# Patient Record
Sex: MALE | Race: ASIAN | Employment: FULL TIME | URBAN - METROPOLITAN AREA
[De-identification: names, ages, dates, MRNs, and addresses within clinical notes are randomized per-mention and may not be internally consistent; named-entity substitution may affect disease eponyms.]

---

## 2018-10-15 ENCOUNTER — OFFICE VISIT (OUTPATIENT)
Dept: URGENT CARE | Facility: URGENT CARE | Age: 33
End: 2018-10-15
Payer: COMMERCIAL

## 2018-10-15 VITALS
SYSTOLIC BLOOD PRESSURE: 110 MMHG | HEART RATE: 82 BPM | OXYGEN SATURATION: 98 % | TEMPERATURE: 97.6 F | DIASTOLIC BLOOD PRESSURE: 72 MMHG
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
## 2018-10-15 DIAGNOSIS — S00.81XA FOREHEAD ABRASION, INITIAL ENCOUNTER: ICD-10-CM

## 2018-10-15 PROCEDURE — 90471 IMMUNIZATION ADMIN: CPT | Performed by: FAMILY MEDICINE

## 2018-10-15 PROCEDURE — 90715 TDAP VACCINE 7 YRS/> IM: CPT | Performed by: FAMILY MEDICINE

## 2018-10-15 PROCEDURE — 99203 OFFICE O/P NEW LOW 30 MIN: CPT | Mod: 25 | Performed by: FAMILY MEDICINE
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
## 2018-10-16 NOTE — PATIENT INSTRUCTIONS
Human Bite  The mouth has bacteria (germs) that can cause a very severe infection. If the tooth of another person has cut your skin, there is a chance of a serious infection developing within the first few days. Bites to the hand are especially prone to infection of the skin (such as cellulitis). Diseases (such as hepatitis B or C, or herpes simplex virus) may also be transmitted through human bites.  Human bite wounds may be either sutured closed or left open to heal depending on location, length of time since the bite, severity, signs of infection, and other concerns. Your doctor may want to do blood tests, a wound culture, X-ray, ultrasound, or others. Your doctor will explain if you need any of these and discuss your results.  Home care  The following will help you care for your wound at home:  1. Most skin wounds heal within 10 days. However, a human bite wound has a higher risk of getting infected. Look at the bite area each day for the next 4 days for signs of infection (listed below).  2. For certain types of wounds, an antibiotic will be prescribed. This will depend on several factors such as severity, surrounding structure injury, depth, location, and others. Take all antibiotics and medicines as directed until they are all gone.  3. If the bite is on the hand, arm, foot, or leg, limit the use of that extremity and keep it elevated for the first 24 hours.  4. You may be given a tetanus shot if needed.  5. Don't suck on the wound. This may introduce more bacteria.  Follow-up care  Follow up with your healthcare provider, or this facility as directed.  When to seek medical advice  Call your healthcare provider right away if you have any of these:    Spreading redness    Increased pain or swelling    Fever of 100.4  F (38  C) or higher, or as directed by your healthcare provider    Colored fluid or pus draining from the wound    Any signs of nerve or tendon damage, such as inability to bend a joint or feel  an area of skin  Date Last Reviewed: 6/1/2016 2000-2017 [employer] [place]. [address] [place] [address], [place] [address]. All rights reserved. This information is not intended as a substitute for professional medical care. Always follow your healthcare professional's instructions.

## 2020-10-28 ENCOUNTER — OFFICE VISIT (OUTPATIENT)
Dept: FAMILY MEDICINE | Facility: CLINIC | Age: 35
End: 2020-10-28

## 2020-10-28 VITALS
WEIGHT: 165.8 LBS | HEIGHT: 68 IN | HEART RATE: 86 BPM | TEMPERATURE: 99.2 F | DIASTOLIC BLOOD PRESSURE: 86 MMHG | OXYGEN SATURATION: 97 % | BODY MASS INDEX: 25.13 KG/M2 | SYSTOLIC BLOOD PRESSURE: 130 MMHG

## 2020-10-28 DIAGNOSIS — G43.809 HEADACHE, VARIANT MIGRAINE: Primary | ICD-10-CM

## 2020-10-28 DIAGNOSIS — Z71.89 ACP (ADVANCE CARE PLANNING): ICD-10-CM

## 2020-10-28 DIAGNOSIS — Z76.89 HEALTH CARE HOME: ICD-10-CM

## 2020-10-28 PROCEDURE — 99203 OFFICE O/P NEW LOW 30 MIN: CPT | Performed by: FAMILY MEDICINE

## 2020-10-28 RX ORDER — PROPRANOLOL HCL 60 MG
60 CAPSULE, EXTENDED RELEASE 24HR ORAL DAILY
Qty: 90 CAPSULE | Refills: 3 | Status: SHIPPED | OUTPATIENT
Start: 2020-10-28

## 2020-10-28 ASSESSMENT — MIFFLIN-ST. JEOR: SCORE: 1673.31

## 2020-10-28 NOTE — PROGRESS NOTES
S:    Avel Schwartz is a 35 year old year old male who presents with a 20 year history of headaches occuring in the behind the eyes area. The headaches are described as throbbing and pressure. They are associated with photophobia but no nausea or vomiting  Pt headache usually improves with sitting in a dark room and taking tylenol.    1-2 headaches /week  Will last 2-4 hours at a time but again responds to tylenol    Mom has a hx of headaches type unknown           ROS:       CONSTITUTIONAL: no constitutional symptoms.       EYES:  no eye complaints except for dry eyes at times     .       ENT: no ENT symptoms  and sneezing or nasal congestion.       NEUROLOGICAL:  no neuro symptoms  .       PSYCHIATRIC: no psychiatric symptoms .       ENDOCRINE: no endocrine symptoms        .       ALLERGY/IMMUNOLOGIC- COMPLAINS of no allergy/immunologic symptoms.              O:       GENERAL:  Well developed, well nourished male in NAD.       HEENT: Normocephalic, atraumatic; Ears-TMs and external auditory        canals normal; Eyes- PERRL, EOMI,  fundi benign; Nose- clear;        Mouth- normal; Pharynx- clear       NECK: Supple without adenopathy or thyromegaly       CHEST: Normal       LUNGS: CTA       HEART:  RRR, S1S2 without murmur.       ABDOMEN: Soft, nontender, normal bowel sounds, no masses, no        hepatosplenomegaly.       NEUROLOGICAL: Cranial nerves II-XII intact. Reflexes symmetrical.        No focal motor or sensory deficits. Cerebellar normal.       LYMPH: No lymphadenopathy.       SKIN:  No rashes.             (G43.809) Headache, variant migraine  (primary encounter diagnosis)  Comment: the fact the headaches have been present 20 years without progression in severity or frequency makes neuro pathology very unlikely  Plan: propranolol ER (INDERAL LA) 60 MG 24 hr capsule        As preventive  Take tylenol 1000mg at a time at onset of headache    (Z71.89) ACP (advance care planning)  Comment:   Plan:      (Z76.89) Health Care Home  Comment:   Plan:

## 2020-10-28 NOTE — NURSING NOTE
Avel is here to establish care and ongoing headaches.    Pre-Visit Screening:  Immunizations:UTD  Colonoscopy:NA  Mammogram:NA  Asthma Action Test/Plan:NA  PHQ9:NA  GAD7:NA  Questioned patient about current smoking habits Pt.never smoked  OK to leave a detailed message on voice mail for today's visit yes, phone # [phone]

## 2020-10-28 NOTE — LETTER
[facility]  [address]  [address]  [facility] [address]  [phone]      October 28, 2020      Avel Schwartz  [address] [facility] [address]      EMERGENCY CARE PLAN  Presenting Problem Treatment Plan   Questions or concerns during clinic hours I will call the clinic directly:    [facility]  [address] , [address]  [place], [place] [id number] [phone]   Questions or concerns outside clinic hours  I will call the 24 hour line at [phone]   Patient needs to schedule an appointment  I will call the  scheduling line at [phone]   Same day treatment   I will call the clinic first, then  urgent care and/or  express care if needed   Clinic Care Coordinators Karen Morelos RN:  [phone]  [facility] Clinical Support Staff: [phone]    Crisis Services:  Behavioral or Mental Health P (Behavioral Health Providers)   [phone]   Emergency treatment--Immediately CALL 101

## 2021-01-03 ENCOUNTER — HEALTH MAINTENANCE LETTER (OUTPATIENT)
Age: 36
End: 2021-01-03

## 2021-10-10 ENCOUNTER — HEALTH MAINTENANCE LETTER (OUTPATIENT)
Age: 36
End: 2021-10-10

## 2021-12-08 ENCOUNTER — NURSE TRIAGE (OUTPATIENT)
Dept: FAMILY MEDICINE | Facility: CLINIC | Age: 36
End: 2021-12-08

## 2021-12-08 ENCOUNTER — OFFICE VISIT (OUTPATIENT)
Dept: URGENT CARE | Facility: URGENT CARE | Age: 36
End: 2021-12-08
Payer: COMMERCIAL

## 2021-12-08 VITALS
TEMPERATURE: 98.8 F | BODY MASS INDEX: 25.89 KG/M2 | SYSTOLIC BLOOD PRESSURE: 138 MMHG | HEART RATE: 110 BPM | WEIGHT: 170.8 LBS | RESPIRATION RATE: 16 BRPM | DIASTOLIC BLOOD PRESSURE: 80 MMHG | OXYGEN SATURATION: 98 %

## 2021-12-08 DIAGNOSIS — L08.9 INFECTED CYST OF SKIN: Primary | ICD-10-CM

## 2021-12-08 DIAGNOSIS — L72.9 INFECTED CYST OF SKIN: Primary | ICD-10-CM

## 2021-12-08 PROCEDURE — 99213 OFFICE O/P EST LOW 20 MIN: CPT | Performed by: PHYSICIAN ASSISTANT

## 2021-12-08 RX ORDER — MUPIROCIN 20 MG/G
OINTMENT TOPICAL 3 TIMES DAILY
Qty: 30 G | Refills: 0 | Status: SHIPPED | OUTPATIENT
Start: 2021-12-08 | End: 2021-12-18

## 2021-12-08 RX ORDER — CEPHALEXIN 500 MG/1
500 CAPSULE ORAL 4 TIMES DAILY
Qty: 28 CAPSULE | Refills: 0 | Status: SHIPPED | OUTPATIENT
Start: 2021-12-08 | End: 2021-12-15

## 2021-12-08 NOTE — PROGRESS NOTES
Assessment & Plan     Infected cyst of skin  Symptoms and exam suggest.  Most likely from an ingrown hair.  Affected area is indurated today.  I&D is deferred.  Keflex is prescribed today.  Bactroban ointment also prescribed.  Recommended heat compresses over the affected area.  Tylenol Motrin as needed for pain/discomfort.  Follow-up if any worsening symptoms. Might need I&D.  He understands and agrees with the plan.    - cephALEXin (KEFLEX) 500 MG capsule  Dispense: 28 capsule; Refill: 0  - mupirocin (BACTROBAN) 2 % external ointment  Dispense: 30 g; Refill: 0         Return in about 4 days (around 12/12/2021) for Symptoms failing to improve.    Sangita Royal PA-C  [facility] URGENT CARE WeareSHANON Vallecillo is a 36 year old male who presents to clinic today for the following health issues:  Chief Complaint   Patient presents with     Mass     pelvic area     HPI    He is presenting to urgent care today with a complaint of swollen, and tender lump in the pelvic area.  Onset of symptoms 4 days ago.  Affected area is increasing in size.  He reports he has had similar symptoms several months back but it has resolved on its own.  Treatment tried cortisone cream, heat compresses.  No drainage from the affected area.  He had a low-grade fever yesterday, but he attributes it to his Covid booster shot that he received 2 days ago (12/6). Otherwise he feels fine.       Review of Systems  Constitutional, HEENT, cardiovascular, pulmonary, GI, , musculoskeletal, neuro, skin, endocrine and psych systems are negative, except as otherwise noted.      Objective    /80 (BP Location: Right arm, Patient Position: Chair, Cuff Size: Adult Regular)   Pulse 110   Temp 98.8  F (37.1  C) (Oral)   Resp 16   Wt 77.5 kg (170 lb 12.8 oz)   SpO2 98%   BMI 25.89 kg/m    Physical Exam   GENERAL: healthy, alert and no distress  SKIN: There is an indurated area about 3 cm in diameter in the mid pubic area,  there is mild erythema, there is tenderness to palpation.  No fluctuance.  No open wounds or drainage.

## 2021-12-08 NOTE — PATIENT INSTRUCTIONS
Patient Education     Infected Epidermoid Cyst (Antibiotic Treatment)   You have an epidermoid cyst. This is a small, painless lump under your skin. An epidermoid cyst is often called an epidermal cyst, an epidermal inclusion cyst, or incorrectly, a sebaceous cyst. Epidermoid cysts form slowly under the skin. They can be found on most parts of the body. But they are most often found on areas with more hair such as the scalp, face, upper back, and genitals.   Here are some general facts about these cysts:     A cyst is a sac filled with material that is often cheesy, fatty, oily, or stringy. The material inside can be thick. Or it can be a liquid.    The area around the cyst may smell bad. If the cyst breaks open, the material inside it often smells bad as well.    You can usually move the cyst slightly if you try.    The cyst can be smaller than a pea or as large as a few inches.    The cyst is usually not painful, unless it becomes inflamed or infected.  Your cyst became infected and your healthcare provider wants to treat it with antibiotics. You will likely take the antibiotics by mouth or apply it as a cream, or both. If the antibiotics don t clear up the infection, the cyst will need to be drained by making a small cut (incision). Local anesthesia will be used to numb the area before the incision and drainage.   Home care    Resist the temptation to squeeze or pop the cyst, stick a needle in it, or cut it open. This often leads to a worsening infection and scarring.    If antibiotic pills were prescribed, take them exactly as directed. Finish the antibiotic prescribed, even though you may feel better after the first few days.    Soak the affected area in hot water or apply a hot pack (a thin, clean towel soaked in hot water) for 20 minutes at a time. Do this 3 to 4 times a day.    If your healthcare provider recommended it, apply antibiotic cream or ointment 2 to 3 times a day.    You may use over-the-counter  pain medicine to control pain, unless another medicine was given. If you have chronic liver or kidney disease or ever had a stomach ulcer or gastrointestinal bleeding, talk with your healthcare provider before using these medicines.    Prevention  Once this infection has healed, reduce the risk of future infections by:     Keeping the cyst area clean by bathing or showering daily    Avoiding tight-fitting clothing in the cyst area  Follow-up care  Follow up with your healthcare provider, or as advised. If a gauze packing was put in your wound, it should be removed in a few days as advised by your healthcare provider. Check your wound every day for the signs listed below.   When to seek medical advice  Call your healthcare provider right away if any of these occur:     Pus coming from the cyst    Increasing redness around the wound    Increasing local pain or swelling    Fever of 100.4 F (38 C) or higher, or as directed by your provider  [place] last reviewed this educational content on 7/1/2019 2000-2021 [employer]. All rights reserved. This information is not intended as a substitute for professional medical care. Always follow your healthcare professional's instructions.

## 2021-12-08 NOTE — TELEPHONE ENCOUNTER
"  Reason for Disposition    Fever and bump is tender to touch    Additional Information    Negative: Patient sounds very sick or weak to the triager    Answer Assessment - Initial Assessment Questions  1. APPEARANCE of LESION: \"What does it look like?\"       A little red but mostly a lump  2. SIZE: \"How big is it?\" (e.g., compare to size of pinhead, tip of pen, eraser, coin, pea, grape, ping pong ball; or size in cms or inches)       0.5 inch in size  3. COLOR: \"What color is it?\" \"Is there more than one color?\"      Little pink  4. SHAPE: \"What shape is it?\" (e.g., round, irregular)      circular  5. RAISED: \"Does it stick up above the skin or is it flat?\" (e.g., raised or elevated)      Raised slightly  6. TENDER: \"Does it hurt when you touch it?\"  (Scale 1-10; or mild, moderate, severe)      It is tender to touch but is not painful  7. LOCATION: \"Where is it located?\"       Mid-lower abdomen just above pubic areas  8. ONSET: \"When did it first appear?\"       Had this same thing 4 months ago and it went away. He has had this lump x 4 days  9. NUMBER: \"Is there just one?\" or \"Are there others?\"      Just 1 lump  10. CAUSE: \"What do you think it is?\"        Not sure  11. OTHER SYMPTOMS: \"Do you have any other symptoms?\" (e.g., fever)        Has a low grade fever but he thinks it's due to the covid booster he received 2 days ago. Denies chills, nausea, vomiting, diarrhea  12. PREGNANCY: \"Is there any chance you are pregnant?\" \"When was your last menstrual period?\"        NA    Protocols used: SKIN LESION - MOLES OR GROWTHS-A-[place]      Pepper Katz BSN, RN    "

## 2022-01-29 ENCOUNTER — HEALTH MAINTENANCE LETTER (OUTPATIENT)
Age: 37
End: 2022-01-29

## 2022-09-18 ENCOUNTER — HEALTH MAINTENANCE LETTER (OUTPATIENT)
Age: 37
End: 2022-09-18

## 2022-12-22 ENCOUNTER — OFFICE VISIT (OUTPATIENT)
Dept: URGENT CARE | Facility: URGENT CARE | Age: 37
End: 2022-12-22
Payer: COMMERCIAL

## 2022-12-22 VITALS
DIASTOLIC BLOOD PRESSURE: 80 MMHG | HEART RATE: 90 BPM | TEMPERATURE: 97.3 F | SYSTOLIC BLOOD PRESSURE: 112 MMHG | OXYGEN SATURATION: 99 % | RESPIRATION RATE: 18 BRPM

## 2022-12-22 DIAGNOSIS — J02.0 STREPTOCOCCAL SORE THROAT: Primary | ICD-10-CM

## 2022-12-22 PROCEDURE — 99213 OFFICE O/P EST LOW 20 MIN: CPT | Performed by: NURSE PRACTITIONER

## 2022-12-22 RX ORDER — PENICILLIN V POTASSIUM 500 MG/1
TABLET, FILM COATED ORAL
COMMUNITY
Start: 2022-12-16

## 2022-12-22 RX ORDER — AZITHROMYCIN 250 MG/1
TABLET, FILM COATED ORAL
Qty: 6 TABLET | Refills: 0 | Status: SHIPPED | OUTPATIENT
Start: 2022-12-22 | End: 2022-12-27

## 2022-12-22 RX ORDER — BENZONATATE 100 MG/1
CAPSULE ORAL
COMMUNITY
Start: 2022-12-16

## 2022-12-22 NOTE — PROGRESS NOTES
Assessment & Plan     Streptococcal sore throat  - azithromycin (ZITHROMAX) 250 MG tablet  Dispense: 6 tablet; Refill: 0   stop penicillin, start zpak.    Push fluids  Follow up if this persists or worsens.        Return in about 1 week (around 12/29/2022) for with regular provider if symptoms persist.    REBECCA Murillo CNP  [facility] CARE RICK Vallecillo is a 37 year old male who presents to clinic today for the following health issues:  Chief Complaint   Patient presents with     Pharyngitis     Pt is here today with [facility], pt was dx with Strep 5 days. Irritation. Pt is still taking the antibiotics he was prescribed. Difficulty swallowing. Fluid in ear.      HPI    URI Adult    Onset of symptoms was 1 week(s) ago.  Course of illness is same.    Severity moderate  Current and Associated symptoms: ear pain bilateral and sore throat  Treatment measures tried include pcn, and this usually does not work for him.  .  Predisposing factors include ill contact: Family member  and exposure to strep.    Review of Systems  Constitutional, HEENT, cardiovascular, pulmonary, GI, , musculoskeletal, neuro, skin, endocrine and psych systems are negative, except as otherwise noted.      Objective    /80 (BP Location: Right arm, Patient Position: Sitting, Cuff Size: Adult Large)   Pulse 90   Temp 97.3  F (36.3  C) (Tympanic)   Resp 18   SpO2 99%   Physical Exam   GENERAL: healthy, alert and no distress  EYES: Eyes grossly normal to inspection, PERRL and conjunctivae and sclerae normal  HENT: ear canals and TM's normal, nose and mouth without ulcers or lesions  NECK: no adenopathy, no asymmetry, masses, or scars and thyroid normal to palpation  RESP: lungs clear to auscultation - no rales, rhonchi or wheezes  CV: regular rate and rhythm, normal S1 S2, no S3 or S4, no murmur, click or rub, no peripheral edema and peripheral pulses strong  MS: no gross musculoskeletal defects  noted, no edema  SKIN: no suspicious lesions or rashes

## 2022-12-22 NOTE — PATIENT INSTRUCTIONS
Push fluids  Lots of handwashing.   Ibuprofen as needed for fever or pain  Delsym or dayquil/nyquil for cough as needed     Rest as able.   Will call if any other labs positive.    F/u in the clinic if symptoms persist or worsen.

## 2023-01-28 ENCOUNTER — HEALTH MAINTENANCE LETTER (OUTPATIENT)
Age: 38
End: 2023-01-28

## 2024-02-25 ENCOUNTER — HEALTH MAINTENANCE LETTER (OUTPATIENT)
Age: 39
End: 2024-02-25

## 2024-06-17 PROBLEM — Z76.89 HEALTH CARE HOME: Status: RESOLVED | Noted: 2020-10-28 | Resolved: 2024-06-17

## 2025-03-15 ENCOUNTER — HEALTH MAINTENANCE LETTER (OUTPATIENT)
Age: 40
End: 2025-03-15